# Patient Record
Sex: MALE | Race: OTHER
[De-identification: names, ages, dates, MRNs, and addresses within clinical notes are randomized per-mention and may not be internally consistent; named-entity substitution may affect disease eponyms.]

---

## 2018-07-13 ENCOUNTER — HOSPITAL ENCOUNTER (OUTPATIENT)
Dept: HOSPITAL 53 - M SFHCADAM | Age: 78
End: 2018-07-13
Attending: FAMILY MEDICINE
Payer: MEDICARE

## 2018-07-13 DIAGNOSIS — R07.9: Primary | ICD-10-CM

## 2018-07-13 LAB
ALBUMIN/GLOBULIN RATIO: 1.33 (ref 1–1.93)
ALBUMIN: 3.6 GM/DL (ref 3.2–5.2)
ALKALINE PHOSPHATASE: 93 U/L (ref 45–117)
ALT SERPL W P-5'-P-CCNC: 21 U/L (ref 12–78)
ANION GAP: 10 MEQ/L (ref 8–16)
AST SERPL-CCNC: 16 U/L (ref 7–37)
BASO #: 0.1 10^3/UL (ref 0–0.2)
BASO %: 0.6 % (ref 0–1)
BILIRUBIN,TOTAL: 0.6 MG/DL (ref 0.2–1)
BLOOD UREA NITROGEN: 19 MG/DL (ref 7–18)
CALCIUM LEVEL: 8.6 MG/DL (ref 8.8–10.2)
CARBON DIOXIDE LEVEL: 26 MEQ/L (ref 21–32)
CHLORIDE LEVEL: 106 MEQ/L (ref 98–107)
CREATININE FOR GFR: 1.37 MG/DL (ref 0.7–1.3)
EOS #: 0.2 10^3/UL (ref 0–0.5)
EOSINOPHIL NFR BLD AUTO: 2.3 % (ref 0–3)
GFR SERPL CREATININE-BSD FRML MDRD: 53.2 ML/MIN/{1.73_M2} (ref 35–?)
GLUCOSE, FASTING: 91 MG/DL (ref 70–100)
HEMATOCRIT: 30.4 % (ref 42–52)
HEMOGLOBIN: 9.9 G/DL (ref 13.5–17.5)
IMMATURE GRANULOCYTE %: 0.6 % (ref 0–3)
LYMPH #: 1.8 10^3/UL (ref 1.5–4.5)
LYMPH %: 21.5 % (ref 24–44)
MEAN CORPUSCULAR HEMOGLOBIN: 30.2 PG (ref 27–33)
MEAN CORPUSCULAR HGB CONC: 32.6 G/DL (ref 32–36.5)
MEAN CORPUSCULAR VOLUME: 92.7 FL (ref 80–96)
MONO #: 0.8 10^3/UL (ref 0–0.8)
MONO %: 9.6 % (ref 0–5)
NEUTROPHILS #: 5.5 10^3/UL (ref 1.8–7.7)
NEUTROPHILS %: 65.4 % (ref 36–66)
NRBC BLD AUTO-RTO: 0 % (ref 0–0)
PLATELET COUNT, AUTOMATED: 499 10^3/UL (ref 150–450)
POTASSIUM SERUM: 4.4 MEQ/L (ref 3.5–5.1)
RED BLOOD COUNT: 3.28 10^6/UL (ref 4.3–6.1)
RED CELL DISTRIBUTION WIDTH: 13.8 % (ref 11.5–14.5)
SODIUM LEVEL: 142 MEQ/L (ref 136–145)
TOTAL PROTEIN: 6.3 GM/DL (ref 6.4–8.2)
WHITE BLOOD COUNT: 8.4 10^3/UL (ref 4–10)

## 2018-07-13 PROCEDURE — 80053 COMPREHEN METABOLIC PANEL: CPT

## 2018-10-29 ENCOUNTER — HOSPITAL ENCOUNTER (OUTPATIENT)
Dept: HOSPITAL 53 - M OPP | Age: 78
Discharge: HOME | End: 2018-10-29
Attending: INTERNAL MEDICINE
Payer: MEDICARE

## 2018-10-29 DIAGNOSIS — D12.3: ICD-10-CM

## 2018-10-29 DIAGNOSIS — Z85.46: ICD-10-CM

## 2018-10-29 DIAGNOSIS — K22.8: ICD-10-CM

## 2018-10-29 DIAGNOSIS — M19.90: ICD-10-CM

## 2018-10-29 DIAGNOSIS — D12.2: ICD-10-CM

## 2018-10-29 DIAGNOSIS — D62: Primary | ICD-10-CM

## 2018-10-29 DIAGNOSIS — K57.30: ICD-10-CM

## 2018-10-29 DIAGNOSIS — Z79.899: ICD-10-CM

## 2018-10-29 DIAGNOSIS — E78.5: ICD-10-CM

## 2018-10-29 DIAGNOSIS — K92.1: ICD-10-CM

## 2018-10-29 DIAGNOSIS — I10: ICD-10-CM

## 2018-10-29 DIAGNOSIS — R07.89: ICD-10-CM

## 2018-10-29 DIAGNOSIS — Z80.0: ICD-10-CM

## 2018-10-29 DIAGNOSIS — R12: ICD-10-CM

## 2018-10-29 DIAGNOSIS — K64.8: ICD-10-CM

## 2018-10-29 DIAGNOSIS — K21.0: ICD-10-CM

## 2018-10-29 DIAGNOSIS — K29.70: ICD-10-CM

## 2018-10-29 PROCEDURE — 45380 COLONOSCOPY AND BIOPSY: CPT

## 2018-10-29 RX ADMIN — SODIUM CHLORIDE 1 MLS/HR: 9 INJECTION, SOLUTION INTRAVENOUS at 07:45

## 2020-06-16 ENCOUNTER — HOSPITAL ENCOUNTER (OUTPATIENT)
Dept: HOSPITAL 53 - M SFHCADAM | Age: 80
End: 2020-06-16
Attending: FAMILY MEDICINE
Payer: MEDICARE

## 2020-06-16 DIAGNOSIS — I10: Primary | ICD-10-CM

## 2020-06-16 LAB
ALBUMIN SERPL BCG-MCNC: 3.7 GM/DL (ref 3.2–5.2)
ALT SERPL W P-5'-P-CCNC: 21 U/L (ref 12–78)
BASOPHILS # BLD AUTO: 0.1 10^3/UL (ref 0–0.2)
BASOPHILS NFR BLD AUTO: 0.7 % (ref 0–1)
BILIRUB SERPL-MCNC: 0.8 MG/DL (ref 0.2–1)
BUN SERPL-MCNC: 23 MG/DL (ref 7–18)
CALCIUM SERPL-MCNC: 8.7 MG/DL (ref 8.8–10.2)
CHLORIDE SERPL-SCNC: 107 MEQ/L (ref 98–107)
CHOLEST SERPL-MCNC: 149 MG/DL (ref ?–200)
CHOLEST/HDLC SERPL: 2.29 {RATIO} (ref ?–5)
CO2 SERPL-SCNC: 27 MEQ/L (ref 21–32)
CREAT SERPL-MCNC: 1.14 MG/DL (ref 0.7–1.3)
EOSINOPHIL # BLD AUTO: 0.2 10^3/UL (ref 0–0.5)
EOSINOPHIL NFR BLD AUTO: 2.2 % (ref 0–3)
GFR SERPL CREATININE-BSD FRML MDRD: > 60 ML/MIN/{1.73_M2} (ref 35–?)
GLUCOSE SERPL-MCNC: 83 MG/DL (ref 70–100)
HCT VFR BLD AUTO: 45.9 % (ref 42–52)
HDLC SERPL-MCNC: 65 MG/DL (ref 40–?)
HGB BLD-MCNC: 15.1 G/DL (ref 13.5–17.5)
LDLC SERPL CALC-MCNC: 73 MG/DL (ref ?–100)
LYMPHOCYTES # BLD AUTO: 2 10^3/UL (ref 1.5–5)
LYMPHOCYTES NFR BLD AUTO: 28.2 % (ref 24–44)
MCH RBC QN AUTO: 29.7 PG (ref 27–33)
MCHC RBC AUTO-ENTMCNC: 32.9 G/DL (ref 32–36.5)
MCV RBC AUTO: 90.4 FL (ref 80–96)
MONOCYTES # BLD AUTO: 0.8 10^3/UL (ref 0–0.8)
MONOCYTES NFR BLD AUTO: 11.6 % (ref 0–5)
NEUTROPHILS # BLD AUTO: 4.1 10^3/UL (ref 1.5–8.5)
NEUTROPHILS NFR BLD AUTO: 57 % (ref 36–66)
NONHDLC SERPL-MCNC: 84 MG/DL
PLATELET # BLD AUTO: 359 10^3/UL (ref 150–450)
POTASSIUM SERPL-SCNC: 4.6 MEQ/L (ref 3.5–5.1)
PROT SERPL-MCNC: 6.5 GM/DL (ref 6.4–8.2)
RBC # BLD AUTO: 5.08 10^6/UL (ref 4.3–6.1)
SODIUM SERPL-SCNC: 140 MEQ/L (ref 136–145)
TRIGL SERPL-MCNC: 57 MG/DL (ref ?–150)
WBC # BLD AUTO: 7.1 10^3/UL (ref 4–10)

## 2021-06-01 ENCOUNTER — HOSPITAL ENCOUNTER (OUTPATIENT)
Dept: HOSPITAL 53 - M SFHCADAM | Age: 81
End: 2021-06-01
Attending: FAMILY MEDICINE
Payer: MEDICARE

## 2021-06-01 DIAGNOSIS — R19.5: Primary | ICD-10-CM

## 2021-06-01 DIAGNOSIS — K62.5: ICD-10-CM

## 2021-06-01 LAB
ALBUMIN SERPL BCG-MCNC: 3.9 GM/DL (ref 3.2–5.2)
ALT SERPL W P-5'-P-CCNC: 18 U/L (ref 12–78)
BASOPHILS # BLD AUTO: 0.1 10^3/UL (ref 0–0.2)
BASOPHILS NFR BLD AUTO: 1.1 % (ref 0–1)
BILIRUB SERPL-MCNC: 0.7 MG/DL (ref 0.2–1)
BUN SERPL-MCNC: 28 MG/DL (ref 7–18)
CALCIUM SERPL-MCNC: 9.9 MG/DL (ref 8.8–10.2)
CHLORIDE SERPL-SCNC: 106 MEQ/L (ref 98–107)
CO2 SERPL-SCNC: 31 MEQ/L (ref 21–32)
CREAT SERPL-MCNC: 1.2 MG/DL (ref 0.7–1.3)
EOSINOPHIL # BLD AUTO: 0.3 10^3/UL (ref 0–0.5)
EOSINOPHIL NFR BLD AUTO: 2.9 % (ref 0–3)
GFR SERPL CREATININE-BSD FRML MDRD: > 60 ML/MIN/{1.73_M2} (ref 35–?)
GLUCOSE SERPL-MCNC: 86 MG/DL (ref 70–100)
HCT VFR BLD AUTO: 49.2 % (ref 42–52)
HGB BLD-MCNC: 16.1 G/DL (ref 13.5–17.5)
LYMPHOCYTES # BLD AUTO: 2.4 10^3/UL (ref 1.5–5)
LYMPHOCYTES NFR BLD AUTO: 27.9 % (ref 24–44)
MCH RBC QN AUTO: 29.7 PG (ref 27–33)
MCHC RBC AUTO-ENTMCNC: 32.7 G/DL (ref 32–36.5)
MCV RBC AUTO: 90.8 FL (ref 80–96)
MONOCYTES # BLD AUTO: 0.9 10^3/UL (ref 0–0.8)
MONOCYTES NFR BLD AUTO: 10.1 % (ref 2–8)
NEUTROPHILS # BLD AUTO: 4.9 10^3/UL (ref 1.5–8.5)
NEUTROPHILS NFR BLD AUTO: 57.6 % (ref 36–66)
PLATELET # BLD AUTO: 345 10^3/UL (ref 150–450)
POTASSIUM SERPL-SCNC: 4.6 MEQ/L (ref 3.5–5.1)
PROT SERPL-MCNC: 6.6 GM/DL (ref 6.4–8.2)
RBC # BLD AUTO: 5.42 10^6/UL (ref 4.3–6.1)
SODIUM SERPL-SCNC: 141 MEQ/L (ref 136–145)
WBC # BLD AUTO: 8.5 10^3/UL (ref 4–10)

## 2021-06-24 ENCOUNTER — HOSPITAL ENCOUNTER (OUTPATIENT)
Dept: HOSPITAL 53 - M LABSMTC | Age: 81
End: 2021-06-24
Attending: ANESTHESIOLOGY
Payer: MEDICARE

## 2021-06-24 DIAGNOSIS — Z01.812: Primary | ICD-10-CM

## 2021-06-24 DIAGNOSIS — Z20.822: ICD-10-CM

## 2021-06-29 ENCOUNTER — HOSPITAL ENCOUNTER (OUTPATIENT)
Dept: HOSPITAL 53 - M OPP | Age: 81
Discharge: HOME | End: 2021-06-29
Attending: INTERNAL MEDICINE
Payer: MEDICARE

## 2021-06-29 VITALS — DIASTOLIC BLOOD PRESSURE: 90 MMHG | SYSTOLIC BLOOD PRESSURE: 148 MMHG

## 2021-06-29 VITALS — WEIGHT: 156 LBS | HEIGHT: 68 IN | BODY MASS INDEX: 23.64 KG/M2

## 2021-06-29 DIAGNOSIS — K57.30: ICD-10-CM

## 2021-06-29 DIAGNOSIS — Z79.899: ICD-10-CM

## 2021-06-29 DIAGNOSIS — Z80.0: ICD-10-CM

## 2021-06-29 DIAGNOSIS — D12.6: Primary | ICD-10-CM

## 2021-06-29 DIAGNOSIS — K64.8: ICD-10-CM

## 2021-06-29 NOTE — ROOR
________________________________________________________________________________

Patient Name: Wayne Valle              Procedure Date: 6/29/2021 8:53 AM

MRN: [id number]                          Account Number: [id number]

YOB: 1940               Age: 81

Room: [facility]                            Gender: Male

Note Status: Finalized                 

________________________________________________________________________________

 

Procedure:            Colonoscopy

Indications:          Positive Cologuard test

Providers:            Gary Angulo MD

Referring MD:         Diann Wyatt DO

Requesting Provider:  

Medicines:            Monitored Anesthesia Care

Complications:        No immediate complications.

________________________________________________________________________________

Procedure:            Pre-Anesthesia Assessment:

                      - Prior to the procedure, a History and Physical was 

                      performed, and patient medications and allergies were 

                      reviewed. The patient is competent. The risks and 

                      benefits of the procedure and the sedation options and 

                      risks were discussed with the patient. All questions 

                      were answered and informed consent was obtained. Patient 

                      identification and proposed procedure were verified by 

                      the physician, the nurse and the anesthesiologist in the 

                      procedure room. Mental Status Examination: alert and 

                      oriented. Airway Examination: normal oropharyngeal 

                      airway and neck mobility. Respiratory Examination: clear 

                      to auscultation. CV Examination: normal. Prophylactic 

                      Antibiotics: The patient does not require prophylactic 

                      antibiotics. Prior Anticoagulants: The patient has taken 

                      no previous anticoagulant or antiplatelet agents. ASA 

                      Grade Assessment: III - A patient with severe systemic 

                      disease. After reviewing the risks and benefits, the 

                      patient was deemed in satisfactory condition to undergo 

                      the procedure. The anesthesia plan was to use monitored 

                      anesthesia care (MAC). Immediately prior to 

                      administration of medications, the patient was 

                      re-assessed for adequacy to receive sedatives. The heart 

                      rate, respiratory rate, oxygen saturations, blood 

                      pressure, adequacy of pulmonary ventilation, and 

                      response to care were monitored throughout the 

                      procedure. The physical status of the patient was 

                      re-assessed after the procedure.

                      The Colonoscope was introduced through the anus and 

                      advanced to the terminal ileum, with identification of 

                      the appendiceal orifice and IC valve. The colonoscopy 

                      was performed without difficulty. The patient tolerated 

                      the procedure well. The quality of the bowel preparation 

                      was good. The terminal ileum, ileocecal valve, 

                      appendiceal orifice, and rectum were photographed. Scope 

                      insertion time was 3 minutes. Scope withdrawal time was 

                      9 minutes. The total duration of the procedure was 12 

                      minutes.

                                                                                

Findings:

     The perianal and digital rectal examinations were normal.

     The terminal ileum appeared normal.

     A 5 mm polyp was found in the ascending colon. The polyp was sessile. The 

     polyp was removed with a hot snare. Resection and retrieval were 

     complete. Verification of patient identification for the specimen was 

     done by the physician and nurse using the patient's name, birth date and 

     medical record number. Estimated blood loss was minimal.

     Multiple small and large-mouthed diverticula were found from sigmoid to 

     ascending colon. There was no evidence of diverticular bleeding.

     Non-bleeding external and internal hemorrhoids were found during 

     retroflexion. The hemorrhoids were medium-sized.

                                                                                

Impression:           - The examined portion of the ileum was normal.

                      - One 5 mm polyp in the ascending colon, removed with a 

                      hot snare. Resected and retrieved.

                      - Severe diverticulosis from sigmoid to ascending colon. 

                      There was no evidence of diverticular bleeding.

                      - Non-bleeding external and internal hemorrhoids.

Recommendation:       - Patient has a contact number available for 

                      emergencies. The signs and symptoms of potential delayed 

                      complications were discussed with the patient. Return to 

                      normal activities tomorrow. Written discharge 

                      instructions were provided to the patient.

                      - High fiber diet.

                      - Continue present medications.

                      - Await pathology results.

                      - Repeat colonoscopy in 5-10 years for surveillance 

                      based on pathology results and depending on clinical and 

                      functional status.

                      - Telephone GI clinic for pathology results in 2 weeks.

                      - Return to primary care physician.

                                                                                

Procedure Code(s):    --- Professional ---

                      68025, Colonoscopy, flexible; with removal of tumor(s), 

                      polyp(s), or other lesion(s) by snare technique

Diagnosis Code(s):    --- Professional ---

                      K64.8, Other hemorrhoids

                      K63.5, Polyp of colon

                      R19.5, Other fecal abnormalities

                      K57.30, Diverticulosis of large intestine without 

                      perforation or abscess without bleeding

 

CPT copyright 2019 [employer]. All rights reserved.

 

The codes documented in this report are preliminary and upon  review may 

be revised to meet current compliance requirements.

 

Gary Angulo MD

_______________________

Gary Angulo MD

6/29/2021 9:43:37 AM

Electronically signed by Gary Angulo MD

Number of Addenda: 0

 

Note Initiated On: 6/29/2021 8:53 AM

Estimated Blood Loss: Estimated blood loss was minimal.